# Patient Record
Sex: MALE | Race: WHITE | NOT HISPANIC OR LATINO | URBAN - METROPOLITAN AREA
[De-identification: names, ages, dates, MRNs, and addresses within clinical notes are randomized per-mention and may not be internally consistent; named-entity substitution may affect disease eponyms.]

---

## 2022-11-22 ENCOUNTER — APPOINTMENT (OUTPATIENT)
Dept: URBAN - METROPOLITAN AREA SURGERY 14 | Age: 77
Setting detail: DERMATOLOGY
End: 2022-12-01

## 2022-11-22 DIAGNOSIS — D485 NEOPLASM OF UNCERTAIN BEHAVIOR OF SKIN: ICD-10-CM

## 2022-11-22 PROBLEM — D48.5 NEOPLASM OF UNCERTAIN BEHAVIOR OF SKIN: Status: ACTIVE | Noted: 2022-11-22

## 2022-11-22 PROCEDURE — OTHER COUNSELING: OTHER

## 2022-11-22 PROCEDURE — OTHER BIOPSY BY SHAVE METHOD: OTHER

## 2022-11-22 PROCEDURE — 69100 BIOPSY OF EXTERNAL EAR: CPT

## 2022-11-22 ASSESSMENT — LOCATION SIMPLE DESCRIPTION DERM: LOCATION SIMPLE: RIGHT EAR

## 2022-11-22 ASSESSMENT — LOCATION DETAILED DESCRIPTION DERM: LOCATION DETAILED: RIGHT SUPERIOR HELIX

## 2022-11-22 ASSESSMENT — LOCATION ZONE DERM: LOCATION ZONE: EAR

## 2022-11-29 ENCOUNTER — APPOINTMENT (OUTPATIENT)
Dept: URBAN - METROPOLITAN AREA SURGERY 14 | Age: 77
Setting detail: DERMATOLOGY
End: 2022-12-01

## 2022-11-29 DIAGNOSIS — L82.1 OTHER SEBORRHEIC KERATOSIS: ICD-10-CM

## 2022-11-29 DIAGNOSIS — D22 MELANOCYTIC NEVI: ICD-10-CM

## 2022-11-29 DIAGNOSIS — D18.0 HEMANGIOMA: ICD-10-CM

## 2022-11-29 DIAGNOSIS — D485 NEOPLASM OF UNCERTAIN BEHAVIOR OF SKIN: ICD-10-CM

## 2022-11-29 DIAGNOSIS — L81.4 OTHER MELANIN HYPERPIGMENTATION: ICD-10-CM

## 2022-11-29 PROBLEM — D48.5 NEOPLASM OF UNCERTAIN BEHAVIOR OF SKIN: Status: ACTIVE | Noted: 2022-11-29

## 2022-11-29 PROBLEM — D18.01 HEMANGIOMA OF SKIN AND SUBCUTANEOUS TISSUE: Status: ACTIVE | Noted: 2022-11-29

## 2022-11-29 PROBLEM — D22.5 MELANOCYTIC NEVI OF TRUNK: Status: ACTIVE | Noted: 2022-11-29

## 2022-11-29 PROCEDURE — 99213 OFFICE O/P EST LOW 20 MIN: CPT

## 2022-11-29 PROCEDURE — OTHER COUNSELING: OTHER

## 2022-11-29 PROCEDURE — OTHER MIPS QUALITY: OTHER

## 2022-11-29 PROCEDURE — OTHER OTHER: OTHER

## 2022-11-29 ASSESSMENT — LOCATION DETAILED DESCRIPTION DERM
LOCATION DETAILED: LEFT MEDIAL SUPERIOR CHEST
LOCATION DETAILED: PERIUMBILICAL SKIN
LOCATION DETAILED: LEFT INFERIOR UPPER BACK
LOCATION DETAILED: RIGHT SUPERIOR HELIX
LOCATION DETAILED: SUPERIOR THORACIC SPINE

## 2022-11-29 ASSESSMENT — LOCATION SIMPLE DESCRIPTION DERM
LOCATION SIMPLE: CHEST
LOCATION SIMPLE: LEFT UPPER BACK
LOCATION SIMPLE: RIGHT EAR
LOCATION SIMPLE: ABDOMEN
LOCATION SIMPLE: UPPER BACK

## 2022-11-29 ASSESSMENT — LOCATION ZONE DERM
LOCATION ZONE: TRUNK
LOCATION ZONE: EAR

## 2022-11-29 NOTE — PROCEDURE: OTHER
Note Text (......Xxx Chief Complaint.): This diagnosis correlates with the
Render Risk Assessment In Note?: no
Other (Free Text): Biopsied last week and waiting on pathology results.
Detail Level: Zone